# Patient Record
Sex: FEMALE | Race: WHITE | NOT HISPANIC OR LATINO | Employment: FULL TIME | URBAN - METROPOLITAN AREA
[De-identification: names, ages, dates, MRNs, and addresses within clinical notes are randomized per-mention and may not be internally consistent; named-entity substitution may affect disease eponyms.]

---

## 2019-11-09 ENCOUNTER — OFFICE VISIT (OUTPATIENT)
Dept: URGENT CARE | Facility: CLINIC | Age: 34
End: 2019-11-09
Payer: COMMERCIAL

## 2019-11-09 VITALS
RESPIRATION RATE: 18 BRPM | TEMPERATURE: 102 F | HEIGHT: 61 IN | OXYGEN SATURATION: 98 % | BODY MASS INDEX: 25.49 KG/M2 | WEIGHT: 135 LBS | SYSTOLIC BLOOD PRESSURE: 112 MMHG | DIASTOLIC BLOOD PRESSURE: 69 MMHG

## 2019-11-09 DIAGNOSIS — J02.9 EXUDATIVE PHARYNGITIS: Primary | ICD-10-CM

## 2019-11-09 LAB
CTP QC/QA: YES
S PYO RRNA THROAT QL PROBE: NEGATIVE

## 2019-11-09 PROCEDURE — 99203 OFFICE O/P NEW LOW 30 MIN: CPT | Mod: S$GLB,,, | Performed by: EMERGENCY MEDICINE

## 2019-11-09 PROCEDURE — 99203 PR OFFICE/OUTPT VISIT, NEW, LEVL III, 30-44 MIN: ICD-10-PCS | Mod: S$GLB,,, | Performed by: EMERGENCY MEDICINE

## 2019-11-09 PROCEDURE — 87880 POCT RAPID STREP A: ICD-10-PCS | Mod: QW,S$GLB,, | Performed by: EMERGENCY MEDICINE

## 2019-11-09 PROCEDURE — 87880 STREP A ASSAY W/OPTIC: CPT | Mod: QW,S$GLB,, | Performed by: EMERGENCY MEDICINE

## 2019-11-09 PROCEDURE — 3008F PR BODY MASS INDEX (BMI) DOCUMENTED: ICD-10-PCS | Mod: CPTII,S$GLB,, | Performed by: EMERGENCY MEDICINE

## 2019-11-09 PROCEDURE — 3008F BODY MASS INDEX DOCD: CPT | Mod: CPTII,S$GLB,, | Performed by: EMERGENCY MEDICINE

## 2019-11-09 RX ORDER — PENICILLIN V POTASSIUM 500 MG/1
500 TABLET, FILM COATED ORAL 2 TIMES DAILY
Qty: 20 TABLET | Refills: 0 | Status: SHIPPED | OUTPATIENT
Start: 2019-11-09 | End: 2019-11-19

## 2019-11-09 NOTE — PATIENT INSTRUCTIONS
Chrsi Mary MD  Go to the Emergency Department for any problems  Call your PCP for follow up next available.    Pharyngitis: Strep (Presumed)    You have pharyngitis (sore throat). The cause is thought to be the streptococcus, or strep, bacterium. Strep throat infection can cause throat pain that is worse when swallowing, aching all over, headache, and fever. The infection may be spread by coughing, kissing, or touching others after touching your mouth or nose. Antibiotic medications are given to treat the infection.  Home care  · Rest at home. Drink plenty of fluids to avoid dehydration.  · No work or school for the first 2 days of taking the antibiotics. After this time, you will not be contagious. You can then return to work or school if you are feeling better.   · The antibiotic medication must be taken for the full 10 days, even if you feel better. This is very important to ensure the infection is treated. It is also important to prevent drug-resistant organisms from developing. If you were given an antibiotic shot, no more antibiotics are needed.  · You may use acetaminophen or ibuprofen to control pain or fever, unless another medicine was prescribed for this. If you have chronic liver or kidney disease or ever had a stomach ulcer or GI bleeding, talk with your doctor before using these medicines.  · Throat lozenges or a throat-numbing sprays can help reduce throat pain. Gargling with warm salt water can also help. Dissolve 1/2 teaspoon of salt in 1 8 ounce glass of warm water.   · Avoid salty or spicy foods, which can irritate the throat.  Follow-up care  Follow up with your healthcare provider or our staff if you are not improving over the next week.  When to seek medical advice  Call your healthcare provider right away if any of these occur:  · Fever as directed by your doctor.   · New or worsening ear pain, sinus pain, or headache  · Painful lumps in the back of neck  · Stiff neck  · Lymph nodes are  getting larger  · Inability to swallow liquids, excessive drooling, or inability to open mouth wide due to throat pain  · Signs of dehydration (very dark urine or no urine, sunken eyes, dizziness)  · Trouble breathing or noisy breathing  · Muffled voice  · New rash  Date Last Reviewed: 4/13/2015  © 1625-4744 [employer] [address] [place] [address], [place] [id number]. All rights reserved. This information is not intended as a substitute for professional medical care. Always follow your healthcare professional's instructions.

## 2019-11-09 NOTE — PROGRESS NOTES
"Subjective:       Patient ID: Urszula Lozano is a 34 y.o. female.    Vitals:  height is 5' 1" (1.549 m) and weight is 61.2 kg (135 lb). Her temperature is 101.7 °F (38.7 °C) (abnormal). Her blood pressure is 112/69. Her respiration is 18 and oxygen saturation is 98%.     Chief Complaint: Fever and Sore Throat    2 d hx sore throat and fever, is not pregnant, NOC.  Does not want rocephin IM at this time.    Fever    Associated symptoms include congestion, headaches and a sore throat. Pertinent negatives include no coughing, ear pain, nausea, rash, vomiting or wheezing. She has tried acetaminophen for the symptoms.   Sore Throat    This is a new problem. The current episode started in the past 7 days. The problem has been gradually worsening. The maximum temperature recorded prior to her arrival was 101 - 101.9 F. The pain is at a severity of 4/10. Associated symptoms include congestion and headaches. Pertinent negatives include no coughing, ear pain, shortness of breath, stridor or vomiting.       Constitution: Positive for fever. Negative for chills, sweating and fatigue.   HENT: Positive for congestion and sore throat. Negative for ear pain, sinus pain, sinus pressure and voice change.    Neck: Negative for painful lymph nodes.   Eyes: Negative for eye redness.   Respiratory: Negative for chest tightness, cough, sputum production, bloody sputum, COPD, shortness of breath, stridor, wheezing and asthma.    Gastrointestinal: Negative for nausea and vomiting.   Musculoskeletal: Negative for muscle ache.   Skin: Negative for rash.   Allergic/Immunologic: Negative for seasonal allergies and asthma.   Neurological: Positive for headaches.   Hematologic/Lymphatic: Negative for swollen lymph nodes.       Objective:      Physical Exam   Constitutional: She is oriented to person, place, and time. She appears well-developed and well-nourished.   HENT:   Head: Normocephalic and atraumatic.   Right Ear: Tympanic membrane, external " ear and ear canal normal.   Left Ear: Tympanic membrane, external ear and ear canal normal.   Nose: Nose normal. Right sinus exhibits no maxillary sinus tenderness and no frontal sinus tenderness. Left sinus exhibits no maxillary sinus tenderness and no frontal sinus tenderness.   Mouth/Throat: Uvula is midline.   White exudates bilat posterior pharynx   Neck: Normal range of motion. Neck supple.   Cardiovascular: Regular rhythm and normal heart sounds.   Pulmonary/Chest: Breath sounds normal.   Musculoskeletal: Normal range of motion.   Lymphadenopathy:     She has no cervical adenopathy.   Neurological: She is alert and oriented to person, place, and time.   Skin: Skin is warm and dry.   Psychiatric: She has a normal mood and affect. Her behavior is normal.         Assessment:       1. Exudative pharyngitis        Plan:         Exudative pharyngitis  -     POCT rapid strep A         Chris Mary MD  Go to the Emergency Department for any problems  Call your PCP for follow up next available.

## 2019-11-12 ENCOUNTER — TELEPHONE (OUTPATIENT)
Dept: URGENT CARE | Facility: CLINIC | Age: 34
End: 2019-11-12